# Patient Record
Sex: FEMALE | Race: WHITE | NOT HISPANIC OR LATINO | Employment: PART TIME | URBAN - METROPOLITAN AREA
[De-identification: names, ages, dates, MRNs, and addresses within clinical notes are randomized per-mention and may not be internally consistent; named-entity substitution may affect disease eponyms.]

---

## 2019-01-23 ENCOUNTER — OFFICE VISIT (OUTPATIENT)
Dept: PODIATRY | Facility: CLINIC | Age: 44
End: 2019-01-23
Payer: COMMERCIAL

## 2019-01-23 VITALS
WEIGHT: 120 LBS | HEART RATE: 92 BPM | TEMPERATURE: 98 F | HEIGHT: 60 IN | SYSTOLIC BLOOD PRESSURE: 117 MMHG | DIASTOLIC BLOOD PRESSURE: 78 MMHG | BODY MASS INDEX: 23.56 KG/M2

## 2019-01-23 DIAGNOSIS — M72.2 PLANTAR FASCIITIS: ICD-10-CM

## 2019-01-23 DIAGNOSIS — M79.672 PAIN OF LEFT HEEL: Primary | ICD-10-CM

## 2019-01-23 PROCEDURE — 73610 PR  X-RAY ANKLE 3+ VW: ICD-10-PCS | Mod: LT,,, | Performed by: PODIATRIST

## 2019-01-23 PROCEDURE — 99203 PR OFFICE/OUTPT VISIT, NEW, LEVL III, 30-44 MIN: ICD-10-PCS | Mod: 25,,, | Performed by: PODIATRIST

## 2019-01-23 PROCEDURE — 99203 OFFICE O/P NEW LOW 30 MIN: CPT | Mod: 25,,, | Performed by: PODIATRIST

## 2019-01-23 PROCEDURE — 73610 X-RAY EXAM OF ANKLE: CPT | Mod: LT,,, | Performed by: PODIATRIST

## 2019-01-23 RX ORDER — METHYLPREDNISOLONE 4 MG/1
TABLET ORAL
Qty: 1 PACKAGE | Refills: 0 | Status: SHIPPED | OUTPATIENT
Start: 2019-01-23 | End: 2019-02-20

## 2019-01-23 RX ORDER — NAPROXEN 500 MG/1
500 TABLET ORAL 2 TIMES DAILY
Qty: 60 TABLET | Refills: 1 | Status: SHIPPED | OUTPATIENT
Start: 2019-01-23

## 2019-01-23 RX ORDER — ZOLMITRIPTAN 2.5 MG/1
TABLET, FILM COATED ORAL
COMMUNITY
Start: 2011-05-12

## 2019-01-23 NOTE — PATIENT INSTRUCTIONS
Plantar Fasciitis  Plantar fasciitis is a painful swelling of the plantar fascia. The plantar fascia is a thick, fibrous layer of tissue. It covers the bones on the bottom of your foot. And it supports the foot bones in an arched position.  This can happen gradually or suddenly. It usually affects one foot at a time. Heel pain can be sharp, like a knife sticking into the bottom of your foot. You may feel pain after exercising, long-distance jogging, stair climbing, long periods of standing, or after standing up.  Risk factors include: non-active lifestyle, arthritis, diabetes, obesity or recent weight gain, flat foot, high arch. Wearing high heels, loose shoes, or shoes with poor arch support for long periods of time adds to the risk. This problem is commonly found in runners and dancers. It also found in people who stand on hard surfaces for long periods of time.  Foot pain from this condition is usually worse in the morning. But it improves with walking. By the end of the day there may be a dull aching. Treatment requires short-term rest and controlling swelling. It may take up to 9 months before all symptoms go away. Rarely, a steroid injection into the foot, or surgery, may be needed.  Home care  · If you are overweight, lose weight to help healing.  · Choose supportive shoes with good arch support and shock absorbency. Replace athletic shoes when they become worn out. Dont walk or run barefoot.  · Premade or custom-fitted shoe inserts may be helpful. Inserts made of silicone seem to be the most effective. Custom-made inserts can be provided by a podiatrist or foot specialist, physical therapist, or orthopedist.  · Premade or custom-made night splints keep the heel stretched out while you sleep. They may prevent morning pain.  · Avoid activities that stress the feet: jogging, prolonged standing or walking, contact sports, etc.  · First thing in the morning and before sports, stretch the bottom of your feet.  Gently flex your ankle so the toes move toward your knee.  · Icing may help control heel pain. Apply an ice pack to the heel for 10-20 minutes as a preventive. Or ice your heel after a severe flare-up of symptoms. You may repeat this every 1-2 hours as needed.  · You may use over-the-counter pain medicine to control pain, unless another medicine was prescribed. Anti-inflammatory pain medicines, such as ibuprofen or naproxen, may work better than acetaminophen. If you have chronic liver or kidney disease or ever had a stomach ulcer or GI bleeding, talk with your healthcare provider before using these medicines.  Follow-up care  Follow up with your healthcare provider, physical therapist, or podiatrist or foot specialist as advised.  Call for an appointment if pain worsens or there is no relief after a few weeks of home treatment. Shoe inserts, a night splint, or a special boot may be required.  If X-rays were taken, you will be told of any new findings that may affect your care.  When to seek medical advice  Call your healthcare provider right away if any of these occur:  · Foot swelling  · Redness with increasing pain  Date Last Reviewed: 11/21/2015  © 8185-1906 [employer]. [address] [place] [address], [place] [address]. All rights reserved. This information is not intended as a substitute for professional medical care. Always follow your healthcare professional's instructions.

## 2019-01-23 NOTE — PROGRESS NOTES
[address] [facility] [address] [place] [address]  Phone: [phone]   [phone]    Patient's PCP:Primary Doctor No  Referring Provider: No ref. provider found    Subjective:      Chief Complaint:: Heel Pain (Left on bottom)    RADHA Pollack is a 43 y.o. female who presents with a complaint of Left heel pain on the bottom lasting for periodically for a while,Right heel also but not anymore,left got worse when walking on bleachers the day before. Current symptoms include pain.  Aggravating factors are walking. Symptoms have increased.Treatment to date have included insoles,stretching,massage ball. Patients rates pain 6/10 on pain scale.    Vitals:    01/23/19 1443   BP: 117/78   Pulse: 92   Temp: 98.4 °F (36.9 °C)     Shoe Size: 6.5-7    Past Surgical History:   Procedure Laterality Date    BREAST BIOPSY Left 2014    benign     History reviewed. No pertinent past medical history.  No family history on file.     Social History:   Marital Status:   Alcohol History:  has no alcohol history on file.  Tobacco History:  reports that  has never smoked. she has never used smokeless tobacco.  Drug History:  has no drug history on file.    Review of patient's allergies indicates:  No Known Allergies    Current Outpatient Medications   Medication Sig Dispense Refill    ACIDOPHILUS/PECTIN, CITRUS (ACIDOPHILUS PROBIOTIC ORAL) Take by mouth.      multivitamin (ONE DAILY MULTIVITAMIN) per tablet Take 1 tablet by mouth once daily.      OGESTREL, 28, 0.5-50 mg-mcg per tablet TK 1 T PO QD CONTINUE BASIC  5    rizatriptan (MAXALT) 5 MG tablet Take 5 mg by mouth as needed.      ZOLMitriptan (ZOMIG) 2.5 mg tablet ZOMIG 2.5 MG TABS      baclofen (LIORESAL) 10 MG tablet Take 1 tablet (10 mg total) by mouth every evening. 30 tablet 6    naratriptan (AMERGE) 1 MG Tab Take 1 mg by mouth once as needed.      norethindrone (ERNESTINA) 0.35 mg tablet Take 1 tablet by mouth once daily.       No current  facility-administered medications for this visit.        Review of Systems   Constitutional: Negative for chills, fatigue, fever and unexpected weight change.   HENT: Negative for hearing loss and trouble swallowing.    Eyes: Negative for photophobia and visual disturbance.   Respiratory: Negative for cough, shortness of breath and wheezing.    Cardiovascular: Negative for chest pain, palpitations and leg swelling.   Gastrointestinal: Negative for abdominal pain and nausea.   Genitourinary: Negative for dysuria and frequency.   Musculoskeletal: Negative for arthralgias, back pain and joint swelling.   Skin: Negative for rash.   Neurological: Positive for headaches. Negative for tremors, seizures, weakness and numbness.   Hematological: Does not bruise/bleed easily.         Objective:        Physical Exam: Constitutional: Patient appears well-developed and well-nourished. Patient is cooperative. No distress.             Right Ankle/Foot Exam     Range of Motion   Ankle Joint   Dorsiflexion: 0     Left Ankle/Foot Exam     Range of Motion   Ankle Joint  Dorsiflexion: 0       Physical Exam   Musculoskeletal:        Feet:        Musculoskeletal: 5/5 muscle strength Bilaterally.  No pain nor crepitation upon range of motion Bilateral feet and ankle joints. Pain on palpation of plantar left heel. Negative Tinel's sign. No pain with lateral compression of heels.     Skin is normal age and health appropriate color, turgor, texture, and temperature bilateral lower extremities without ulceration, hyperpigmentation, discoloration, masses nodules or cords palpated.  No ecchymosis, erythema, edema, or cardinal signs of infection bilateral lower extremities.    Adequate joint range of motion without pain, limitation, nor crepitation Bilateral feet and ankle joints      Protective sensation intact by 10 g monofilament all ten toes/both feet.  Pain sensation intact bilateral feet and legs.    DP and PT pulses 2/4 bilateral,  capillary refill 3 seconds all toes/distal feet, all toes/both feet warm to touch.   Negavie lower extremity edema bilateral.    Imaging:   X-Ray Ankle Complete Left  An AP, oblique, and lateral view of left foot was obtained and reviewed.  There is no evidence of fracture or dislocation in the foot.    The joint spaces appear relatively well preserved.    Electronically Signed By: Essie Chávez DPM         Assessment:       No diagnosis found.  Plan:   There are no diagnoses linked to this encounter.  No Follow-up on file.    Procedures - None    Counseling:     Plantar Fasciitis Level I Treatment:   Anti-inflammatory  No bare feet  Over the counter insert  Restrict activity level   Use running shoes at full time  No impact exercise and stretch gastrocnemius muscle  I provided patient education verbally regarding:   Patient diagnosis, treatment options, as well as alternatives, risks, and benefits.     Discussed different treatment options for heel pain. I gave written and verbal instructions on heel cord stretching and this was demonstrated for the patient. Patient expressed understanding. Discussed wearing appropriate shoe gear and avoiding flats, slippers, sandals, barefoot, and sockfeet. Recommended arch supports. My recommendation for OTC supports is Spenco polysorb replacement insoles or patient may elect more aggressive treatment with prescription arch supports. We also discussed cortisone injections and NSAID therapy.     This note was created using Dragon voice recognition software that occasionally misinterpreted phrases or words.

## 2019-02-20 ENCOUNTER — OFFICE VISIT (OUTPATIENT)
Dept: PODIATRY | Facility: CLINIC | Age: 44
End: 2019-02-20
Payer: COMMERCIAL

## 2019-02-20 VITALS
WEIGHT: 125 LBS | SYSTOLIC BLOOD PRESSURE: 112 MMHG | HEART RATE: 82 BPM | DIASTOLIC BLOOD PRESSURE: 72 MMHG | BODY MASS INDEX: 24.41 KG/M2

## 2019-02-20 DIAGNOSIS — M76.62 ACHILLES TENDINITIS OF LEFT LOWER EXTREMITY: Primary | ICD-10-CM

## 2019-02-20 DIAGNOSIS — M25.579 ANKLE PAIN, UNSPECIFIED CHRONICITY, UNSPECIFIED LATERALITY: ICD-10-CM

## 2019-02-20 DIAGNOSIS — M79.673 HEEL PAIN, UNSPECIFIED LATERALITY: ICD-10-CM

## 2019-02-20 PROCEDURE — 99213 OFFICE O/P EST LOW 20 MIN: CPT | Mod: 25,,, | Performed by: PODIATRIST

## 2019-02-20 PROCEDURE — 73630 X-RAY EXAM OF FOOT: CPT | Mod: LT,,, | Performed by: PODIATRIST

## 2019-02-20 PROCEDURE — 99213 PR OFFICE/OUTPT VISIT, EST, LEVL III, 20-29 MIN: ICD-10-PCS | Mod: 25,,, | Performed by: PODIATRIST

## 2019-02-20 PROCEDURE — 73630 PR  X-RAY FOOT 3+ VW: ICD-10-PCS | Mod: LT,,, | Performed by: PODIATRIST

## 2019-02-20 RX ORDER — AMOXICILLIN 500 MG
CAPSULE ORAL DAILY
COMMUNITY

## 2019-02-20 RX ORDER — METHYLPREDNISOLONE 4 MG/1
TABLET ORAL
Qty: 1 PACKAGE | Refills: 0 | Status: SHIPPED | OUTPATIENT
Start: 2019-02-20

## 2019-02-20 NOTE — LETTER
February 20, 2019      [facility] - Podiatry  [address]  [facility] [address]  Phone: [phone]  Fax: [phone]       Patient: Candace Pollack   YOB: 1975  Date of Visit: 02/20/2019    To Whom It May Concern:    Alberto Pollack  was at [facility] on 02/20/2019. She may return to work with no restrictions. She will be in a boot cast for 2-4 weeks so will not be able to go to the gym for this period pending further review. If you have any questions or concerns, or if I can be of further assistance, please do not hesitate to contact me.    Sincerely,  Electronically Signed by: MARLIN Gordon MA

## 2019-02-20 NOTE — LETTER
February 20, 2019      [facility]  [address] [facility] [address]  [place] [address]  Phone: [phone]  Fax: [phone]       Patient: Candace Pollack   YOB: 1975  Date of Visit: 02/20/2019    To Whom It May Concern:    Alberto Pollack  was at our office on 02/20/2019. She may return to work.  She will be in a boot cast for 2-4 weeks and will not be able to go to the gym pending further review. If you have any questions or concerns, or if I can be of further assistance, please do not hesitate to contact me.    Sincerely,  Electronically Signed by: MARLIN Gordon MA

## 2019-02-20 NOTE — PROGRESS NOTES
[address] [facility] [address] [place] [address]  Phone: [phone]   [phone]    Patient's PCP:Primary Doctor No  Referring Provider: No ref. provider found    Subjective:      Chief Complaint:: Ankle Pain (left foot)    RADHA Pollack is a 44 y.o. female who presents with a complaint of left ankle pain a little in heel and radiates up ankle lasting since saturday. Onset of the symptoms was pt was walking up bleachers and must've stepped wrong.  Current symptoms include sharp pain, feels sharpness in heel and radiates up ankle, pt steps and felt like burning went through whole heel.  Aggravating factors are walking. Symptoms have gotten worse. Treatment to date have included none. Pt took naprosyn for heel pain (PF) but did not notice any relief. Patients rates pain 5/10 on pain scale.      Vitals:    02/20/19 1453   BP: 112/72   Pulse: 82     Shoe Size: 6 / 7    Past Surgical History:   Procedure Laterality Date    BREAST BIOPSY Left 2014    benign     History reviewed. No pertinent past medical history.  History reviewed. No pertinent family history.     Social History:   Marital Status:   Alcohol History:  has no alcohol history on file.  Tobacco History:  reports that  has never smoked. she has never used smokeless tobacco.  Drug History:  has no drug history on file.    Review of patient's allergies indicates:  No Known Allergies    Current Outpatient Medications   Medication Sig Dispense Refill    fish oil-omega-3 fatty acids 300-1,000 mg capsule Take by mouth once daily.      multivitamin (ONE DAILY MULTIVITAMIN) per tablet Take 1 tablet by mouth once daily.      naproxen (NAPROSYN) 500 MG tablet Take 1 tablet (500 mg total) by mouth 2 (two) times daily. 60 tablet 1    OGESTREL, 28, 0.5-50 mg-mcg per tablet TK 1 T PO QD CONTINUE BASIC  5    rizatriptan (MAXALT) 5 MG tablet Take 5 mg by mouth as needed.      ZOLMitriptan (ZOMIG) 2.5 mg tablet ZOMIG 2.5 MG TABS       ACIDOPHILUS/PECTIN, CITRUS (ACIDOPHILUS PROBIOTIC ORAL) Take by mouth.      methylPREDNISolone (MEDROL DOSEPACK) 4 mg tablet use as directed 1 Package 0     No current facility-administered medications for this visit.        Review of Systems      Objective:        Physical Exam:   Foot Exam  Physical Exam   Musculoskeletal: 5/5 muscle strength Bilaterally.  No pain nor crepitation upon range of motion Bilateral feet and ankle joints.     Pain on palpation of plantar left heel with radiation into the achilles. Left achilles tendon insertion pain with activity. Negative Tinel's sign. No pain with lateral compression of heels. No swelling to achilles noted.      Skin is normal age and health appropriate color, turgor, texture, and temperature bilateral lower extremities without ulceration, hyperpigmentation, discoloration, masses nodules or cords palpated.  No ecchymosis, erythema, edema, or cardinal signs of infection bilateral lower extremities.     Adequate joint range of motion without pain, limitation, nor crepitation Bilateral feet and ankle joints      Protective sensation intact by 10 g monofilament all ten toes/both feet.  Pain sensation intact bilateral feet and legs.     DP and PT pulses 2/4 bilateral, capillary refill 3 seconds all toes/distal feet, all toes/both feet warm to touch.   Negavie lower extremity edema bilateral.         Imaging:   X-Ray Foot Complete Left  An AP, oblique, and lateral view of left foot was obtained and reviewed.  There is no evidence of fracture or dislocation in the foot.    The joint spaces appear relatively well preserved.    Electronically Signed By: Essie Chávez DPM           Assessment:       1. Achilles tendinitis of left lower extremity - Left Foot    2. Ankle pain, unspecified chronicity, unspecified laterality - Left Foot    3. Heel pain, unspecified laterality - Left Foot      Plan:   Achilles tendinitis of left lower extremity - Left Foot  -     X-Ray Foot  Complete Left  -     methylPREDNISolone (MEDROL DOSEPACK) 4 mg tablet; use as directed  Dispense: 1 Package; Refill: 0    Ankle pain, unspecified chronicity, unspecified laterality - Left Foot  -     X-Ray Foot Complete Left  -     methylPREDNISolone (MEDROL DOSEPACK) 4 mg tablet; use as directed  Dispense: 1 Package; Refill: 0    Heel pain, unspecified laterality - Left Foot  -     X-Ray Foot Complete Left  -     methylPREDNISolone (MEDROL DOSEPACK) 4 mg tablet; use as directed  Dispense: 1 Package; Refill: 0      Follow-up if symptoms worsen or fail to improve.    Procedures - None    Counseling:     I provided patient education verbally regarding:   Patient diagnosis, treatment options, as well as alternatives, risks, and benefits.     This note was created using Dragon voice recognition software that occasionally misinterpreted phrases or words.     Discussed treatment of Achilles tendonitis with rest, ice, medrol dose pack,  heel lifts, no barefoot, and cam walker boot.  Explained the importance of proper stretching. Discussed possibility of MRI for further evaluation if symptoms do not improve.     Patient to wear boot with heel lift for the next 2 weeks. Patient to follow-up in 2 weeks. If patient is not doing significantly better in 2 weeks, we will do a cortisone shot into the heel.

## 2019-02-21 ENCOUNTER — TELEPHONE (OUTPATIENT)
Dept: PODIATRY | Facility: CLINIC | Age: 44
End: 2019-02-21

## 2019-02-21 NOTE — TELEPHONE ENCOUNTER
Pt called lmom yesterday afternoon, saw dr. dodd approximately 1 month ago for PF, pt states she is wearing insoles and does not feel as bad but the pain in heel is on and off, and the discomfort moved toward achilles area.  Pt wanted to know if it was normal with now having the insoles or if the doctor thought it was something else.         I called pt back who said dr wanted pt to contact us 1 month after to let us know how she was doing.  Pt states now having some heel pain which is still there but is more towards the back of the heel and radiates up into the ankle area.  Pt asks if that is normal or is it due to something else?  Stated she is wearing inserts and can still feel them but not in as much pain as when first started, but can still feel in arch that something is there.  The pt is not so much in the heel area anymore.  Pt continued to do all treatments she was told for PF.  Pain was burning in heel, but not more stabbing pain and radiates up the ankle.      We got pt scheduled who came in yesterday 02/20.

## 2021-04-29 ENCOUNTER — PATIENT MESSAGE (OUTPATIENT)
Dept: RESEARCH | Facility: HOSPITAL | Age: 46
End: 2021-04-29

## 2021-09-14 ENCOUNTER — TELEPHONE (OUTPATIENT)
Dept: GASTROENTEROLOGY | Facility: CLINIC | Age: 46
End: 2021-09-14

## 2021-09-14 ENCOUNTER — PATIENT MESSAGE (OUTPATIENT)
Dept: GASTROENTEROLOGY | Facility: CLINIC | Age: 46
End: 2021-09-14

## 2021-09-14 DIAGNOSIS — Z12.11 SCREENING FOR COLON CANCER: Primary | ICD-10-CM

## 2021-09-29 ENCOUNTER — TELEPHONE (OUTPATIENT)
Dept: GASTROENTEROLOGY | Facility: CLINIC | Age: 46
End: 2021-09-29

## 2021-10-12 ENCOUNTER — PATIENT MESSAGE (OUTPATIENT)
Dept: ENDOSCOPY | Facility: HOSPITAL | Age: 46
End: 2021-10-12

## 2021-10-26 ENCOUNTER — LAB VISIT (OUTPATIENT)
Dept: PRIMARY CARE CLINIC | Facility: CLINIC | Age: 46
End: 2021-10-26

## 2021-10-26 DIAGNOSIS — Z01.818 PRE-OP TESTING: ICD-10-CM

## 2021-10-26 PROCEDURE — U0005 INFEC AGEN DETEC AMPLI PROBE: HCPCS | Performed by: INTERNAL MEDICINE

## 2021-10-26 PROCEDURE — U0003 INFECTIOUS AGENT DETECTION BY NUCLEIC ACID (DNA OR RNA); SEVERE ACUTE RESPIRATORY SYNDROME CORONAVIRUS 2 (SARS-COV-2) (CORONAVIRUS DISEASE [COVID-19]), AMPLIFIED PROBE TECHNIQUE, MAKING USE OF HIGH THROUGHPUT TECHNOLOGIES AS DESCRIBED BY CMS-2020-01-R: HCPCS | Performed by: INTERNAL MEDICINE

## 2021-10-27 LAB
SARS-COV-2 RNA RESP QL NAA+PROBE: NOT DETECTED
SARS-COV-2- CYCLE NUMBER: NORMAL

## 2021-10-28 ENCOUNTER — PATIENT MESSAGE (OUTPATIENT)
Dept: ENDOSCOPY | Facility: HOSPITAL | Age: 46
End: 2021-10-28

## 2021-10-29 ENCOUNTER — ANESTHESIA (OUTPATIENT)
Dept: ENDOSCOPY | Facility: HOSPITAL | Age: 46
End: 2021-10-29

## 2021-10-29 ENCOUNTER — ANESTHESIA EVENT (OUTPATIENT)
Dept: ENDOSCOPY | Facility: HOSPITAL | Age: 46
End: 2021-10-29

## 2021-10-29 ENCOUNTER — HOSPITAL ENCOUNTER (OUTPATIENT)
Facility: HOSPITAL | Age: 46
Discharge: HOME | End: 2021-10-29
Attending: INTERNAL MEDICINE | Admitting: INTERNAL MEDICINE

## 2021-10-29 DIAGNOSIS — K64.8 INTERNAL HEMORRHOIDS: Primary | ICD-10-CM

## 2021-10-29 DIAGNOSIS — Z12.11 COLON CANCER SCREENING: ICD-10-CM

## 2021-10-29 LAB
B-HCG UR QL: NEGATIVE
CTP QC/QA: YES

## 2021-10-29 PROCEDURE — G0121 COLON CA SCRN NOT HI RSK IND: HCPCS | Performed by: INTERNAL MEDICINE

## 2021-10-29 PROCEDURE — D9220A PRA ANESTHESIA: Mod: ,,, | Performed by: ANESTHESIOLOGY

## 2021-10-29 PROCEDURE — 25000003 PHARM REV CODE 250: Performed by: NURSE ANESTHETIST, CERTIFIED REGISTERED

## 2021-10-29 PROCEDURE — 81025 URINE PREGNANCY TEST: CPT | Performed by: INTERNAL MEDICINE

## 2021-10-29 PROCEDURE — 37000009 HC ANESTHESIA EA ADD 15 MINS: Performed by: INTERNAL MEDICINE

## 2021-10-29 PROCEDURE — D9220A PRA ANESTHESIA: ICD-10-PCS | Mod: ,,, | Performed by: ANESTHESIOLOGY

## 2021-10-29 PROCEDURE — 37000008 HC ANESTHESIA 1ST 15 MINUTES: Performed by: INTERNAL MEDICINE

## 2021-10-29 PROCEDURE — G0121 COLON CA SCRN NOT HI RSK IND: ICD-10-PCS | Mod: ,,, | Performed by: INTERNAL MEDICINE

## 2021-10-29 PROCEDURE — 63600175 PHARM REV CODE 636 W HCPCS: Performed by: NURSE ANESTHETIST, CERTIFIED REGISTERED

## 2021-10-29 PROCEDURE — G0121 COLON CA SCRN NOT HI RSK IND: HCPCS | Mod: ,,, | Performed by: INTERNAL MEDICINE

## 2021-10-29 PROCEDURE — 25000003 PHARM REV CODE 250: Performed by: INTERNAL MEDICINE

## 2021-10-29 RX ORDER — SODIUM CHLORIDE 9 MG/ML
INJECTION, SOLUTION INTRAVENOUS CONTINUOUS
Status: DISCONTINUED | OUTPATIENT
Start: 2021-10-29 | End: 2021-10-29 | Stop reason: HOSPADM

## 2021-10-29 RX ORDER — LIDOCAINE HCL/PF 100 MG/5ML
SYRINGE (ML) INTRAVENOUS
Status: DISCONTINUED | OUTPATIENT
Start: 2021-10-29 | End: 2021-10-29

## 2021-10-29 RX ORDER — PROPOFOL 10 MG/ML
VIAL (ML) INTRAVENOUS
Status: DISCONTINUED | OUTPATIENT
Start: 2021-10-29 | End: 2021-10-29

## 2021-10-29 RX ADMIN — LIDOCAINE HYDROCHLORIDE 50 MG: 20 INJECTION INTRAVENOUS at 09:10

## 2021-10-29 RX ADMIN — PROPOFOL 50 MG: 10 INJECTION, EMULSION INTRAVENOUS at 10:10

## 2021-10-29 RX ADMIN — SODIUM CHLORIDE: 0.9 INJECTION, SOLUTION INTRAVENOUS at 09:10

## 2021-10-29 RX ADMIN — PROPOFOL 100 MG: 10 INJECTION, EMULSION INTRAVENOUS at 09:10

## 2021-10-29 RX ADMIN — PROPOFOL 50 MG: 10 INJECTION, EMULSION INTRAVENOUS at 09:10

## 2021-11-01 VITALS
DIASTOLIC BLOOD PRESSURE: 75 MMHG | OXYGEN SATURATION: 96 % | RESPIRATION RATE: 18 BRPM | HEIGHT: 60 IN | WEIGHT: 130 LBS | BODY MASS INDEX: 25.52 KG/M2 | SYSTOLIC BLOOD PRESSURE: 124 MMHG | HEART RATE: 81 BPM | TEMPERATURE: 98 F

## 2024-11-06 ENCOUNTER — LAB VISIT (OUTPATIENT)
Dept: LAB | Facility: HOSPITAL | Age: 49
End: 2024-11-06
Attending: OBSTETRICS & GYNECOLOGY

## 2024-11-06 DIAGNOSIS — Z00.00 WELLNESS EXAMINATION: Primary | ICD-10-CM

## 2024-11-06 DIAGNOSIS — Z00.00 ENCOUNTER FOR PREVENTIVE CARE: ICD-10-CM

## 2024-11-06 LAB
25(OH)D3+25(OH)D2 SERPL-MCNC: 39 NG/ML (ref 30–96)
ALBUMIN SERPL BCP-MCNC: 4 G/DL (ref 3.5–5.2)
ALP SERPL-CCNC: 45 U/L (ref 40–150)
ALT SERPL W/O P-5'-P-CCNC: 25 U/L (ref 10–44)
ANION GAP SERPL CALC-SCNC: 13 MMOL/L (ref 8–16)
AST SERPL-CCNC: 23 U/L (ref 10–40)
BASOPHILS # BLD AUTO: 0.06 K/UL (ref 0–0.2)
BASOPHILS NFR BLD: 0.7 % (ref 0–1.9)
BILIRUB SERPL-MCNC: 1.4 MG/DL (ref 0.1–1)
BUN SERPL-MCNC: 11 MG/DL (ref 6–20)
CALCIUM SERPL-MCNC: 9.4 MG/DL (ref 8.7–10.5)
CHLORIDE SERPL-SCNC: 103 MMOL/L (ref 95–110)
CHOLEST SERPL-MCNC: 241 MG/DL (ref 120–199)
CHOLEST/HDLC SERPL: 4.7 {RATIO} (ref 2–5)
CO2 SERPL-SCNC: 22 MMOL/L (ref 23–29)
CREAT SERPL-MCNC: 0.7 MG/DL (ref 0.5–1.4)
DIFFERENTIAL METHOD BLD: ABNORMAL
EOSINOPHIL # BLD AUTO: 0.1 K/UL (ref 0–0.5)
EOSINOPHIL NFR BLD: 0.9 % (ref 0–8)
ERYTHROCYTE [DISTWIDTH] IN BLOOD BY AUTOMATED COUNT: 13.3 % (ref 11.5–14.5)
EST. GFR  (NO RACE VARIABLE): >60 ML/MIN/1.73 M^2
ESTIMATED AVG GLUCOSE: 91 MG/DL (ref 68–131)
GLUCOSE SERPL-MCNC: 71 MG/DL (ref 70–110)
HBA1C MFR BLD: 4.8 % (ref 4.5–6.2)
HCT VFR BLD AUTO: 41.3 % (ref 37–48.5)
HDLC SERPL-MCNC: 51 MG/DL (ref 40–75)
HDLC SERPL: 21.2 % (ref 20–50)
HGB BLD-MCNC: 13.5 G/DL (ref 12–16)
IMM GRANULOCYTES # BLD AUTO: 0.01 K/UL (ref 0–0.04)
IMM GRANULOCYTES NFR BLD AUTO: 0.1 % (ref 0–0.5)
LDLC SERPL CALC-MCNC: 154 MG/DL (ref 63–159)
LYMPHOCYTES # BLD AUTO: 2.4 K/UL (ref 1–4.8)
LYMPHOCYTES NFR BLD: 26.6 % (ref 18–48)
MCH RBC QN AUTO: 27.3 PG (ref 27–31)
MCHC RBC AUTO-ENTMCNC: 32.7 G/DL (ref 32–36)
MCV RBC AUTO: 84 FL (ref 82–98)
MONOCYTES # BLD AUTO: 0.6 K/UL (ref 0.3–1)
MONOCYTES NFR BLD: 7 % (ref 4–15)
NEUTROPHILS # BLD AUTO: 5.9 K/UL (ref 1.8–7.7)
NEUTROPHILS NFR BLD: 64.7 % (ref 38–73)
NONHDLC SERPL-MCNC: 190 MG/DL
NRBC BLD-RTO: 0 /100 WBC
PLATELET # BLD AUTO: 466 K/UL (ref 150–450)
PMV BLD AUTO: 9.4 FL (ref 9.2–12.9)
POTASSIUM SERPL-SCNC: 4.3 MMOL/L (ref 3.5–5.1)
PROT SERPL-MCNC: 7.6 G/DL (ref 6–8.4)
RBC # BLD AUTO: 4.94 M/UL (ref 4–5.4)
SODIUM SERPL-SCNC: 138 MMOL/L (ref 136–145)
TRIGL SERPL-MCNC: 180 MG/DL (ref 30–150)
TSH SERPL DL<=0.005 MIU/L-ACNC: 2.83 UIU/ML (ref 0.4–4)
WBC # BLD AUTO: 9.03 K/UL (ref 3.9–12.7)

## 2024-11-06 PROCEDURE — 82306 VITAMIN D 25 HYDROXY: CPT | Performed by: OBSTETRICS & GYNECOLOGY

## 2024-11-06 PROCEDURE — 80061 LIPID PANEL: CPT | Performed by: OBSTETRICS & GYNECOLOGY

## 2024-11-06 PROCEDURE — 80053 COMPREHEN METABOLIC PANEL: CPT | Performed by: OBSTETRICS & GYNECOLOGY

## 2024-11-06 PROCEDURE — 83036 HEMOGLOBIN GLYCOSYLATED A1C: CPT | Performed by: OBSTETRICS & GYNECOLOGY

## 2024-11-06 PROCEDURE — 85025 COMPLETE CBC W/AUTO DIFF WBC: CPT | Performed by: OBSTETRICS & GYNECOLOGY

## 2024-11-06 PROCEDURE — 84443 ASSAY THYROID STIM HORMONE: CPT | Performed by: OBSTETRICS & GYNECOLOGY

## 2025-01-02 ENCOUNTER — TELEPHONE (OUTPATIENT)
Dept: FAMILY MEDICINE | Facility: CLINIC | Age: 50
End: 2025-01-02

## 2025-01-06 ENCOUNTER — OFFICE VISIT (OUTPATIENT)
Dept: FAMILY MEDICINE | Facility: CLINIC | Age: 50
End: 2025-01-06

## 2025-01-06 VITALS
WEIGHT: 143 LBS | SYSTOLIC BLOOD PRESSURE: 116 MMHG | OXYGEN SATURATION: 98 % | BODY MASS INDEX: 28.07 KG/M2 | HEART RATE: 83 BPM | DIASTOLIC BLOOD PRESSURE: 68 MMHG | HEIGHT: 60 IN

## 2025-01-06 DIAGNOSIS — Z76.89 ENCOUNTER TO ESTABLISH CARE: Primary | ICD-10-CM

## 2025-01-06 DIAGNOSIS — B00.1 HERPES LABIALIS: ICD-10-CM

## 2025-01-06 DIAGNOSIS — G43.829 MENSTRUAL MIGRAINE WITHOUT STATUS MIGRAINOSUS, NOT INTRACTABLE: ICD-10-CM

## 2025-01-06 PROCEDURE — 99203 OFFICE O/P NEW LOW 30 MIN: CPT | Mod: S$GLB,,,

## 2025-01-06 RX ORDER — NYSTATIN 100000 [USP'U]/G
POWDER TOPICAL
COMMUNITY

## 2025-01-06 RX ORDER — FLUCONAZOLE 150 MG/1
150 TABLET ORAL
COMMUNITY
End: 2025-01-06

## 2025-01-06 RX ORDER — VALACYCLOVIR HYDROCHLORIDE 1 G/1
1000 TABLET, FILM COATED ORAL
COMMUNITY

## 2025-01-06 NOTE — PROGRESS NOTES
SUBJECTIVE:    Patient ID: Candace Pollack is a 49 y.o. female.    Chief Complaint: Establish Care (No bottles//Pt here to establish care//discuss congestion since Dec 18th that got better around [place] that came back at New Years//home BP cuff brought because of pt's  who is also a new pt. Home cuff readin/68 p:90//declines flu vacc//Pt states she has PAP and mammo done in October. Requested//JL)    HPI  History of Present Illness    CHIEF COMPLAINT:  Candace presents today for follow-up regarding multiple health concerns.    CURRENT ILLNESS:  She reports sinus congestion that began following a fever blister in mid-December, for which she took Valtrex. She experienced a sore throat and congestion after the initial fever blister and recently developed another fever blister.    MIGRAINES:  She takes Zomig for migraine management with good response. Her migraines were previously hormonal in nature, though this pattern has since changed.    MUSCULOSKELETAL:  She has a history of bilateral shoulder issues. MRI W Contrast showed tendonitis rather than initially suspected torn rotator cuff.    GYNECOLOGIC HISTORY:  She takes oral contraceptives continuously and has not had a period recently. She plans to discontinue after finishing the current pack. She had one abnormal pap smear several years ago which resolved immediately without HPV involvement. Most recent pap smear in October was normal.    BREAST HISTORY:  She has history of two benign breast biopsies. First was fine needle aspiration in . Second was ultrasound-guided core needle biopsy of the left breast with clip placement. She has since been transitioned to routine screening mammograms.    LABS:  Recent labs from November showed slightly elevated cholesterol and triglycerides. Thyroid and vitamin D levels were within normal limits. Metabolic panel including kidney, liver, and electrolytes were excellent. Bilirubin was slightly elevated due to  Gilbert syndrome. Platelets were slightly above average.    FAMILY HISTORY:  Grandmother had Marfan syndrome. Mother has atrial fibrillation without other cardiac conditions.    SOCIAL HISTORY:  Works as a hygienist one day per week.      ROS:  General: -fever, -chills, -fatigue, -weight gain, -weight loss  Eyes: -vision changes, -redness, -discharge  ENT: -ear pain, +nasal congestion, +sore throat  Cardiovascular: -chest pain, -palpitations, -lower extremity edema  Respiratory: -cough, -shortness of breath  Gastrointestinal: -abdominal pain, -nausea, -vomiting, -diarrhea, -constipation, -blood in stool  Genitourinary: -dysuria, -hematuria, -frequency  Musculoskeletal: -joint pain, -muscle pain  Skin: -rash, -lesion  Neurological: -headache, -dizziness, -numbness, -tingling  Psychiatric: -anxiety, -depression, -sleep difficulty         Lab Visit on 11/06/2024   Component Date Value Ref Range Status    WBC 11/06/2024 9.03  3.90 - 12.70 K/uL Final    RBC 11/06/2024 4.94  4.00 - 5.40 M/uL Final    Hemoglobin 11/06/2024 13.5  12.0 - 16.0 g/dL Final    Hematocrit 11/06/2024 41.3  37.0 - 48.5 % Final    MCV 11/06/2024 84  82 - 98 fL Final    MCH 11/06/2024 27.3  27.0 - 31.0 pg Final    MCHC 11/06/2024 32.7  32.0 - 36.0 g/dL Final    RDW 11/06/2024 13.3  11.5 - 14.5 % Final    Platelets 11/06/2024 466 (H)  150 - 450 K/uL Final    MPV 11/06/2024 9.4  9.2 - 12.9 fL Final    Immature Granulocytes 11/06/2024 0.1  0.0 - 0.5 % Final    Gran # (ANC) 11/06/2024 5.9  1.8 - 7.7 K/uL Final    Immature Grans (Abs) 11/06/2024 0.01  0.00 - 0.04 K/uL Final    Lymph # 11/06/2024 2.4  1.0 - 4.8 K/uL Final    Mono # 11/06/2024 0.6  0.3 - 1.0 K/uL Final    Eos # 11/06/2024 0.1  0.0 - 0.5 K/uL Final    Baso # 11/06/2024 0.06  0.00 - 0.20 K/uL Final    nRBC 11/06/2024 0  0 /100 WBC Final    Gran % 11/06/2024 64.7  38.0 - 73.0 % Final    Lymph % 11/06/2024 26.6  18.0 - 48.0 % Final    Mono % 11/06/2024 7.0  4.0 - 15.0 % Final    Eosinophil %  11/06/2024 0.9  0.0 - 8.0 % Final    Basophil % 11/06/2024 0.7  0.0 - 1.9 % Final    Differential Method 11/06/2024 Automated   Final    Sodium 11/06/2024 138  136 - 145 mmol/L Final    Potassium 11/06/2024 4.3  3.5 - 5.1 mmol/L Final    Chloride 11/06/2024 103  95 - 110 mmol/L Final    CO2 11/06/2024 22 (L)  23 - 29 mmol/L Final    Glucose 11/06/2024 71  70 - 110 mg/dL Final    BUN 11/06/2024 11  6 - 20 mg/dL Final    Creatinine 11/06/2024 0.7  0.5 - 1.4 mg/dL Final    Calcium 11/06/2024 9.4  8.7 - 10.5 mg/dL Final    Total Protein 11/06/2024 7.6  6.0 - 8.4 g/dL Final    Albumin 11/06/2024 4.0  3.5 - 5.2 g/dL Final    Total Bilirubin 11/06/2024 1.4 (H)  0.1 - 1.0 mg/dL Final    Alkaline Phosphatase 11/06/2024 45  40 - 150 U/L Final    AST 11/06/2024 23  10 - 40 U/L Final    ALT 11/06/2024 25  10 - 44 U/L Final    eGFR 11/06/2024 >60  >60 mL/min/1.73 m^2 Final    Anion Gap 11/06/2024 13  8 - 16 mmol/L Final    Hemoglobin A1C 11/06/2024 4.8  4.5 - 6.2 % Final    Estimated Avg Glucose 11/06/2024 91  68 - 131 mg/dL Final    Vit D, 25-Hydroxy 11/06/2024 39  30 - 96 ng/mL Final    TSH 11/06/2024 2.833  0.400 - 4.000 uIU/mL Final    Cholesterol 11/06/2024 241 (H)  120 - 199 mg/dL Final    Triglycerides 11/06/2024 180 (H)  30 - 150 mg/dL Final    HDL 11/06/2024 51  40 - 75 mg/dL Final    LDL Cholesterol 11/06/2024 154.0  63.0 - 159.0 mg/dL Final    HDL/Cholesterol Ratio 11/06/2024 21.2  20.0 - 50.0 % Final    Total Cholesterol/HDL Ratio 11/06/2024 4.7  2.0 - 5.0 Final    Non-HDL Cholesterol 11/06/2024 190  mg/dL Final       Past Medical History:   Diagnosis Date    Migraines     Rotator cuff tendinitis 09/22/2014     Social History     Socioeconomic History    Marital status:    Tobacco Use    Smoking status: Never    Smokeless tobacco: Never   Substance and Sexual Activity    Alcohol use: Yes     Comment: rarely    Drug use: Never     Social Drivers of Health     Financial Resource Strain: Low Risk  (12/30/2024)     Overall Financial Resource Strain (CARDIA)     Difficulty of Paying Living Expenses: Not hard at all   Food Insecurity: No Food Insecurity (2024)    Hunger Vital Sign     Worried About Running Out of Food in the Last Year: Never true     Ran Out of Food in the Last Year: Never true   Physical Activity: Sufficiently Active (2024)    Exercise Vital Sign     Days of Exercise per Week: 3 days     Minutes of Exercise per Session: 60 min   Stress: No Stress Concern Present (2024)    [employer] - Occupational Stress Questionnaire     Feeling of Stress : Not at all   Housing Stability: Unknown (2024)    Housing Stability Vital Sign     Unable to Pay for Housing in the Last Year: No     Past Surgical History:   Procedure Laterality Date    BREAST BIOPSY Left     benign needle biopsy     SECTION      COLONOSCOPY N/A 10/29/2021    Procedure: COLONOSCOPY;  Surgeon: Pranav Morrison MD;  Location: [facility];  Service: Endoscopy;  Laterality: N/A;     No family history on file.    The 10-year CVD risk score (D'Agostino, et al., 2008) is: 4.7%    Values used to calculate the score:      Age: 49 years      Sex: Female      Diabetic: No      Tobacco smoker: No      Systolic Blood Pressure: 116 mmHg      Is BP treated: No      HDL Cholesterol: 51 mg/dL      Total Cholesterol: 241 mg/dL    Tests to Keep You Healthy    Mammogram: Met on 10/19/2024  Colon Cancer Screening: Met on 10/29/2021  Cervical Cancer Screening: DUE      Review of patient's allergies indicates:  No Known Allergies    Current Outpatient Medications:     MAGNESIUM GLYCINATE ORAL, Take by mouth., Disp: , Rfl:     multivitamin (THERAGRAN) per tablet, Take 1 tablet by mouth once daily., Disp: , Rfl:     norethindrone ac/eth estradiol (MICROGESTIN 1.5, 21, ORAL), Take by mouth., Disp: , Rfl:     nystatin (MYCOSTATIN) powder, Apply topically as needed (redness under breasts)., Disp: , Rfl:      "valACYclovir (VALTREX) 1000 MG tablet, Take 1,000 mg by mouth as needed., Disp: , Rfl:     ZOLMitriptan (ZOMIG) 2.5 mg tablet, 5 mg as needed., Disp: , Rfl:     Review of Systems        Objective:      Vitals:    01/06/25 1344   BP: 116/68   Pulse: 83   SpO2: 98%   Weight: 64.9 kg (143 lb)   Height: 4' 11.5" (1.511 m)     Physical Exam  Physical Exam    Constitutional: In no acute distress. Normal appearance. Well-developed. Not ill-appearing.  HENT: Normocephalic. Atraumatic. External ears normal bilaterally. Nose normal. Normal lips. Oropharynx clear. Congestion. Nasal congestion present. Turbinates swollen.  Eyes: No scleral icterus. Pupils are equal, round, and reactive to light.  Neck: No thyromegaly. No carotid bruit.  Cardiovascular: Normal rate and regular rhythm. Radial pulses are 2+ bilaterally. Normal heart sounds. No murmur heard.  Pulmonary: Pulmonary effort is normal. Normal breath sounds.  Abdomen: Bowel sounds are normal. Abdomen is soft. No abdominal tenderness.  Musculoskeletal: Normal range of motion at all joints. Normal range of motion of the cervical back. No lumbar spasms. No lower extremity edema bilaterally.  Skin: Warm. Dry. Capillary refill takes less than 2 seconds.  Neurological: No focal deficit present. Alert and oriented to person, place, and time. No cranial nerve deficit. Sensation intact. No motor weakness. Gait is intact.  Psychiatric: Attention normal. Mood normal. Speech normal. Behavior is cooperative. No homicidal ideation. No suicidal ideation.           Assessment:       1. Encounter to establish care    2. Menstrual migraine without status migrainosus, not intractable    3. Herpes labialis         Plan:       Encounter to establish care    Menstrual migraine without status migrainosus, not intractable    Herpes labialis      Assessment & Plan    IMPRESSION:  - Assessed congestion symptoms  - Reviewed lab results, noting slightly elevated cholesterol and triglycerides  - " Evaluated history of rotator cuff issues, currently asymptomatic  - Considered history of breast biopsies, now on routine screening  - Noted Gilbert syndrome as cause of chronically elevated bilirubin  - Assessed cold sore outbreak in context of recent viral infection    GILBERT SYNDROME:  - Explained Gilbert syndrome as benign condition causing slightly elevated bilirubin.    INFLAMMATION:  - Discussed importance of reducing refined sugar intake for inflammation reduction.  - Candace to reduce intake of refined sugars and processed foods.    NASAL CONGESTION:  - Clarified difference between viral and allergic congestion, advising against antihistamines for current symptoms.  - Candace to monitor congestion symptoms, noting any tooth pain or discolored discharge.  - Started Sudafed (pseudoephedrine) for nasal congestion.  - Started Mucinex (guaifenesin) 600 mg to thin secretions.  - Contact the office if congestion symptoms persist beyond 7-10 days or if experiencing severe tooth pain.    COLD SORES:  - Educated on proper use of Valtrex for cold sore outbreaks.  - Continued Valtrex for cold sores.  - Take 2000 mg at first symptom and another 2000 mg 12 hours later.    MEDICATIONS/SUPPLEMENTS:  - Continued magnesium glycinate supplement.  - Continued multivitamin ([employer] brand).    MIGRAINE:  - Contact the office when running low on Zomig for refill.    FOLLOW UP:  - Follow up annually or as needed.         Follow up in about 1 year (around 1/6/2026), or if symptoms worsen or fail to improve, for ANNUAL.        This note was generated with the assistance of ambient listening technology. Verbal consent was obtained by the patient and accompanying visitor(s) for the recording of patient appointment to facilitate this note. I attest to having reviewed and edited the generated note for accuracy, though some syntax or spelling errors may persist. Please contact the author of this note for any clarification.      1/6/2025  Edith Deshpande

## 2025-01-06 NOTE — LETTER
[address] [facility] [address]  [place] [address]  Phone: [phone]   [phone]                        MD Camille Randolph MD Chequita Williams, MD Matthew Bassett, PA-C [place], [place], [place], [place]      Date: 01/06/2025        Patient: Candace Pollack  YOB: 1975      To whom it may concern:    Please fax over the above patient's most recent pap smear and mammogram results.       Sincerely,     Vaishnavi Baron LPN

## 2025-01-10 ENCOUNTER — PATIENT MESSAGE (OUTPATIENT)
Dept: FAMILY MEDICINE | Facility: CLINIC | Age: 50
End: 2025-01-10

## 2025-01-10 DIAGNOSIS — B96.89 ACUTE BACTERIAL SINUSITIS: Primary | ICD-10-CM

## 2025-01-10 DIAGNOSIS — J01.90 ACUTE BACTERIAL SINUSITIS: Primary | ICD-10-CM

## 2025-01-10 RX ORDER — CEFDINIR 300 MG/1
300 CAPSULE ORAL 2 TIMES DAILY
Qty: 20 CAPSULE | Refills: 0 | Status: SHIPPED | OUTPATIENT
Start: 2025-01-10 | End: 2025-01-20

## 2025-01-27 ENCOUNTER — TELEPHONE (OUTPATIENT)
Dept: FAMILY MEDICINE | Facility: CLINIC | Age: 50
End: 2025-01-27

## 2025-01-27 NOTE — TELEPHONE ENCOUNTER
----- Message from Gabo sent at 1/27/2025  9:55 AM CST -----  Pt dropped off envelope w/ mammogram and pap results for provider.  [phone]